# Patient Record
Sex: FEMALE | Race: BLACK OR AFRICAN AMERICAN | NOT HISPANIC OR LATINO | URBAN - METROPOLITAN AREA
[De-identification: names, ages, dates, MRNs, and addresses within clinical notes are randomized per-mention and may not be internally consistent; named-entity substitution may affect disease eponyms.]

---

## 2020-07-25 ENCOUNTER — TELEPHONE ENCOUNTER (OUTPATIENT)
Dept: URBAN - METROPOLITAN AREA CLINIC 13 | Facility: CLINIC | Age: 75
End: 2020-07-25

## 2020-07-25 RX ORDER — MELOXICAM 15 MG/1
TAKE 1 TABLET DAILY TABLET ORAL
Refills: 0 | OUTPATIENT
Start: 2009-12-03 | End: 2011-11-18

## 2020-07-25 RX ORDER — ATENOLOL AND CHLORTHALIDONE 50; 25 MG/1; MG/1
TAKE 1 TABLET DAILY TABLET ORAL
Refills: 0 | OUTPATIENT
Start: 2009-12-03 | End: 2011-11-18

## 2020-07-26 ENCOUNTER — TELEPHONE ENCOUNTER (OUTPATIENT)
Dept: URBAN - METROPOLITAN AREA CLINIC 13 | Facility: CLINIC | Age: 75
End: 2020-07-26

## 2020-07-26 RX ORDER — AZITHROMYCIN DIHYDRATE 500 MG/1
TABLET, FILM COATED ORAL
Qty: 3 | Refills: 0 | Status: ACTIVE | COMMUNITY
Start: 2011-09-28

## 2020-07-26 RX ORDER — NAPROXEN 500 MG/1
TAKE 1 TABLET TWICE DAILY AS NEEDED TABLET ORAL
Refills: 0 | Status: ACTIVE | COMMUNITY

## 2020-07-26 RX ORDER — POLYETHYLENE GLYCOL 3350, SODIUM SULFATE, SODIUM CHLORIDE, POTASSIUM CHLORIDE, ASCORBIC ACID, SODIUM ASCORBATE 7.5-2.691G
KIT ORAL
Qty: 1 | Refills: 0 | Status: ACTIVE | COMMUNITY
Start: 2011-11-29

## 2020-07-26 RX ORDER — BENAZEPRIL HCL 20 MG
TAKE 1 TABLET DAILY TABLET ORAL
Refills: 0 | Status: ACTIVE | COMMUNITY

## 2020-07-26 RX ORDER — ROSUVASTATIN CALCIUM 20 MG
TAKE 1 TABLET DAILY TABLET ORAL
Refills: 0 | Status: ACTIVE | COMMUNITY
Start: 2009-12-03

## 2020-07-26 RX ORDER — SITAGLIPTIN PHOSPHATE 100 MG
TAKE 1 TABLET DAILY TABLET ORAL
Refills: 0 | Status: ACTIVE | COMMUNITY
Start: 2009-12-03

## 2020-07-26 RX ORDER — ATENOLOL AND CHLORTHALIDONE 100; 25 MG/1; MG/1
TAKE 1 TABLET DAILY TABLET ORAL
Refills: 0 | Status: ACTIVE | COMMUNITY

## 2020-07-26 RX ORDER — GLUCOSAMINE HCL/CHONDROITIN SU 500-400 MG
TAKE 1 CAPSULE DAILY CAPSULE ORAL
Refills: 0 | Status: ACTIVE | COMMUNITY

## 2020-07-26 RX ORDER — PREDNISONE 10 MG/1
TABLET ORAL
Qty: 21 | Refills: 0 | Status: ACTIVE | COMMUNITY
Start: 2011-09-28

## 2022-01-14 ENCOUNTER — TELEPHONE ENCOUNTER (OUTPATIENT)
Dept: URBAN - METROPOLITAN AREA CLINIC 113 | Facility: CLINIC | Age: 77
End: 2022-01-14

## 2022-02-03 ENCOUNTER — OFFICE VISIT (OUTPATIENT)
Dept: URBAN - METROPOLITAN AREA CLINIC 113 | Facility: CLINIC | Age: 77
End: 2022-02-03

## 2022-02-16 ENCOUNTER — OFFICE VISIT (OUTPATIENT)
Dept: URBAN - METROPOLITAN AREA CLINIC 113 | Facility: CLINIC | Age: 77
End: 2022-02-16
Payer: MEDICARE

## 2022-02-16 ENCOUNTER — WEB ENCOUNTER (OUTPATIENT)
Dept: URBAN - METROPOLITAN AREA CLINIC 113 | Facility: CLINIC | Age: 77
End: 2022-02-16

## 2022-02-16 VITALS
SYSTOLIC BLOOD PRESSURE: 128 MMHG | HEIGHT: 66 IN | BODY MASS INDEX: 40.98 KG/M2 | TEMPERATURE: 98.2 F | HEART RATE: 76 BPM | DIASTOLIC BLOOD PRESSURE: 80 MMHG | WEIGHT: 255 LBS

## 2022-02-16 DIAGNOSIS — Z12.11 ENCOUNTER FOR SCREENING COLONOSCOPY FOR NON-HIGH-RISK PATIENT: ICD-10-CM

## 2022-02-16 DIAGNOSIS — K21.9 GASTROESOPHAGEAL REFLUX DISEASE, UNSPECIFIED WHETHER ESOPHAGITIS PRESENT: ICD-10-CM

## 2022-02-16 DIAGNOSIS — K57.30 ACQUIRED DIVERTICULOSIS OF COLON: ICD-10-CM

## 2022-02-16 PROBLEM — 235595009: Status: ACTIVE | Noted: 2022-02-16

## 2022-02-16 PROBLEM — 397881000: Status: ACTIVE | Noted: 2022-02-16

## 2022-02-16 PROCEDURE — 99243 OFF/OP CNSLTJ NEW/EST LOW 30: CPT

## 2022-02-16 PROCEDURE — 99203 OFFICE O/P NEW LOW 30 MIN: CPT

## 2022-02-16 RX ORDER — POLYETHYLENE GLYCOL 3350, SODIUM SULFATE, SODIUM CHLORIDE, POTASSIUM CHLORIDE, ASCORBIC ACID, SODIUM ASCORBATE 7.5-2.691G
KIT ORAL
Qty: 1 | Refills: 0 | Status: ON HOLD | COMMUNITY
Start: 2011-11-29

## 2022-02-16 RX ORDER — ONDANSETRON HYDROCHLORIDE 8 MG/1
1 TABLET AS NEEDED TABLET, FILM COATED ORAL ONCE A DAY
Status: ACTIVE | COMMUNITY

## 2022-02-16 RX ORDER — ROSUVASTATIN CALCIUM 20 MG
TAKE 1 TABLET DAILY TABLET ORAL
Refills: 0 | Status: ON HOLD | COMMUNITY
Start: 2009-12-03

## 2022-02-16 RX ORDER — DOCUSATE SODIUM 100 MG/1
1 CAPSULE AS NEEDED CAPSULE ORAL ONCE A DAY
Status: ACTIVE | COMMUNITY

## 2022-02-16 RX ORDER — NAPROXEN 500 MG/1
TAKE 1 TABLET TWICE DAILY AS NEEDED TABLET ORAL
Refills: 0 | Status: ON HOLD | COMMUNITY

## 2022-02-16 RX ORDER — ASPIRIN 81 MG/1
1 TABLET TABLET ORAL ONCE A DAY
Status: ACTIVE | COMMUNITY

## 2022-02-16 RX ORDER — MELATONIN 5 MG
1 TABLET IN THE EVENING TABLET ORAL ONCE A DAY
Status: ACTIVE | COMMUNITY

## 2022-02-16 RX ORDER — PREDNISONE 10 MG/1
TABLET ORAL
Qty: 21 | Refills: 0 | Status: ON HOLD | COMMUNITY
Start: 2011-09-28

## 2022-02-16 RX ORDER — BUMETANIDE 2 MG/1
2 TABLET TABLET ORAL ONCE A DAY
Status: ACTIVE | COMMUNITY

## 2022-02-16 RX ORDER — PANTOPRAZOLE SODIUM 40 MG/1
1 TABLET TABLET, DELAYED RELEASE ORAL ONCE A DAY
Status: ACTIVE | COMMUNITY

## 2022-02-16 RX ORDER — ATORVASTATIN CALCIUM 20 MG/1
1 TABLET TABLET, FILM COATED ORAL ONCE A DAY
Status: ACTIVE | COMMUNITY

## 2022-02-16 RX ORDER — POLYETHYLENE GLYCOL 3350, SODIUM CHLORIDE, SODIUM BICARBONATE, POTASSIUM CHLORIDE 420; 11.2; 5.72; 1.48 G/4L; G/4L; G/4L; G/4L
420 G POWDER, FOR SOLUTION ORAL ONCE
Qty: 420 G | Refills: 0 | OUTPATIENT
Start: 2022-02-16 | End: 2022-02-17

## 2022-02-16 RX ORDER — ATENOLOL AND CHLORTHALIDONE 100; 25 MG/1; MG/1
TAKE 1 TABLET DAILY TABLET ORAL
Refills: 0 | Status: ON HOLD | COMMUNITY

## 2022-02-16 RX ORDER — BENAZEPRIL HCL 20 MG
TAKE 1 TABLET DAILY TABLET ORAL
Refills: 0 | Status: ON HOLD | COMMUNITY

## 2022-02-16 RX ORDER — SITAGLIPTIN PHOSPHATE 100 MG
TAKE 1 TABLET DAILY TABLET ORAL
Refills: 0 | Status: ON HOLD | COMMUNITY
Start: 2009-12-03

## 2022-02-16 RX ORDER — ACYCLOVIR 200 MG/1
1 CAPSULE CAPSULE ORAL THREE TIMES A DAY
Status: ACTIVE | COMMUNITY

## 2022-02-16 RX ORDER — FERROUS SULFATE 325(65) MG
1 TABLET TABLET ORAL ONCE A DAY
Status: ACTIVE | COMMUNITY

## 2022-02-16 RX ORDER — POTASSIUM CHLORIDE 1500 MG/1
1 TABLET WITH FOOD TABLET, FILM COATED, EXTENDED RELEASE ORAL ONCE A DAY
Status: ACTIVE | COMMUNITY

## 2022-02-16 RX ORDER — OXYCODONE HYDROCHLORIDE 5 MG/1
1 TABLET AS NEEDED TABLET ORAL
Status: ACTIVE | COMMUNITY

## 2022-02-16 RX ORDER — CYANOCOBALAMIN (VITAMIN B-12) 500 MCG
2 TABLET TABLET ORAL ONCE A DAY
Status: ACTIVE | COMMUNITY

## 2022-02-16 RX ORDER — AZITHROMYCIN DIHYDRATE 500 MG/1
TABLET, FILM COATED ORAL
Qty: 3 | Refills: 0 | Status: ON HOLD | COMMUNITY
Start: 2011-09-28

## 2022-02-16 RX ORDER — GLUCOSAMINE HCL/CHONDROITIN SU 500-400 MG
TAKE 1 CAPSULE DAILY CAPSULE ORAL
Refills: 0 | Status: ON HOLD | COMMUNITY

## 2022-02-16 RX ORDER — BENZONATATE 200 MG/1
1 CAPSULE CAPSULE ORAL THREE TIMES A DAY
Status: ACTIVE | COMMUNITY

## 2022-02-16 RX ORDER — GLUTAMIC ACID
AS DIRECTED POWDER (GRAM) MISCELLANEOUS
Status: ACTIVE | COMMUNITY

## 2022-02-16 NOTE — HPI-TODAY'S VISIT:
76 year old female with a history of multiple myeloma (dx 9/2021), hypertension, T2DM, CKD Stage 4 with secondary hyperparathyroidism, GERD and family history of PUD and gastric mass with brother is referred by Dr. Amy Orantes for colon cancer screening. A copy of today's visit will be forwarded to the referring provider.   She was initially seen in the office in 2009 with dark stools. Hemoccult negative x 3 at the time. Colonoscopy performed in Feb 2005 revealed internal hemorrhoids and stool in the right colon causing limited visualization. She was last seen on 11/18/2011. GERD was well controlled on daily Prilosec. She did have abdominal pain around the umbilicus. She was planned for another colonoscopy due to poor visualization of prior colonoscopy. Colonoscopy in December 2011 was only notable for diverticulosis. She was due for repeat screening in 2021.   Bowel movements are regular. She denies blood per rectum. She did have a few episode of dark stools last year however she attributed this to her iron supplement. She denies abdominal pain, nausea or vomiting. Heartburn is well controlled on Pantoprazole 40 mg daily. She denies any recent cardiovascular events, anticoagulant use, oxygen therapy or pacemaker placement. She was recently diagnosed with Multiple Myeloma in September 2021 and is currently on oral chemotherapy.

## 2022-02-16 NOTE — HPI-OTHER HISTORIES
Labs 12/30/21: elevated BUN 36, elevated creatinine 2.63, normal TB, normal LFTs, low GFR 18, normal hgb A1c 5.5, low WBC 3.7, elevated eos 6.3, low hgb 10.4, low hct 31.9, low plt 128.  Colonoscopy 12/9/2011: The quality of the bowel prep was fair. Ileum was normal. Diverticulosis of the sigmoid colon and ascending colon. Repeat in 10 years for surveillance. Due in 2021.   Colonoscopy 2/3/2005: internal hemorrhoids and residual stool in the right colon, limiting visulization.   EGD 2/3/2005: small sliding hiatal hernia

## 2022-04-07 ENCOUNTER — OFFICE VISIT (OUTPATIENT)
Dept: URBAN - METROPOLITAN AREA SURGERY CENTER 25 | Facility: SURGERY CENTER | Age: 77
End: 2022-04-07

## 2022-06-06 ENCOUNTER — DASHBOARD ENCOUNTERS (OUTPATIENT)
Age: 77
End: 2022-06-06

## 2022-06-07 ENCOUNTER — OFFICE VISIT (OUTPATIENT)
Dept: URBAN - METROPOLITAN AREA CLINIC 113 | Facility: CLINIC | Age: 77
End: 2022-06-07

## 2022-06-07 NOTE — HPI-TODAY'S VISIT:
76 year old female with a history of multiple myeloma (dx 9/2021), hypertension, T2DM, CKD Stage 4 with secondary hyperparathyroidism, GERD and family history of PUD and gastric mass with brother is referred by Dr. Amy Orantes for colon cancer screening. A copy of today's visit will be forwarded to the referring provider.   She was initially seen in the office in 2009 with dark stools. Hemoccult negative x 3 at the time. Colonoscopy performed in Feb 2005 revealed internal hemorrhoids and stool in the right colon causing limited visualization. She was last seen on 11/18/2011. GERD was well controlled on daily Prilosec. She did have abdominal pain around the umbilicus. She was planned for another colonoscopy due to poor visualization of prior colonoscopy. Colonoscopy in December 2011 was only notable for diverticulosis. She was due for repeat screening in 2021.   Bowel movements are regular. She denies blood per rectum. She did have a few episode of dark stools last year however she attributed this to her iron supplement. She denies abdominal pain, nausea or vomiting. Heartburn is well controlled on Pantoprazole 40 mg daily. She denies any recent cardiovascular events, anticoagulant use, oxygen therapy or pacemaker placement. She was recently diagnosed with Multiple Myeloma in September 2021 and is currently on oral chemotherapy.   Interval history: 76-year-old female with a history of multiple myeloma (diagnosed 9/2021), hypertension, type 2 diabetes, CKD stage IV with secondary hyperparathyroidism, GERD and family history of PUD and gastric mass with brother presents for follow-up after colonoscopy.  She was last seen on 2/16/2022 as a referral for colon cancer screening.  Patient was overdue for screening as her last colonoscopy in 2011 was only notable for diverticulosis.  She does ambulate with a walker however stated she was able to shift her weight and climb onto bed without assistance.  Daughter who was present also agreed that this would not be an issue.  Her GERD remains well controlled on pantoprazole 40 mg once daily.  A colonoscopy was scheduled for 4/7/2022 and was canceled for unknown reasons.